# Patient Record
Sex: FEMALE | NOT HISPANIC OR LATINO | Employment: OTHER | URBAN - METROPOLITAN AREA
[De-identification: names, ages, dates, MRNs, and addresses within clinical notes are randomized per-mention and may not be internally consistent; named-entity substitution may affect disease eponyms.]

---

## 2023-05-13 ENCOUNTER — OFFICE VISIT (OUTPATIENT)
Dept: FAMILY MEDICINE | Facility: CLINIC | Age: 63
End: 2023-05-13

## 2023-05-13 VITALS
SYSTOLIC BLOOD PRESSURE: 106 MMHG | DIASTOLIC BLOOD PRESSURE: 73 MMHG | TEMPERATURE: 98 F | OXYGEN SATURATION: 97 % | HEART RATE: 93 BPM

## 2023-05-13 DIAGNOSIS — J06.9 VIRAL URI WITH COUGH: Primary | ICD-10-CM

## 2023-05-13 DIAGNOSIS — J02.9 ACUTE SORE THROAT: ICD-10-CM

## 2023-05-13 DIAGNOSIS — R05.1 ACUTE COUGH: ICD-10-CM

## 2023-05-13 LAB
DEPRECATED S PYO AG THROAT QL EIA: NEGATIVE
GROUP A STREP BY PCR: NOT DETECTED

## 2023-05-13 PROCEDURE — 99203 OFFICE O/P NEW LOW 30 MIN: CPT

## 2023-05-13 PROCEDURE — 87651 STREP A DNA AMP PROBE: CPT

## 2023-05-13 RX ORDER — ASPIRIN 81 MG/1
81 TABLET ORAL DAILY
COMMUNITY

## 2023-05-13 RX ORDER — BENZONATATE 200 MG/1
200 CAPSULE ORAL 3 TIMES DAILY PRN
Qty: 21 CAPSULE | Refills: 0 | Status: SHIPPED | OUTPATIENT
Start: 2023-05-13

## 2023-05-13 RX ORDER — AZELASTINE 1 MG/ML
1 SPRAY, METERED NASAL 2 TIMES DAILY
Qty: 30 ML | Refills: 0 | Status: SHIPPED | OUTPATIENT
Start: 2023-05-13

## 2023-05-13 RX ORDER — CHOLECALCIFEROL (VITAMIN D3) 50 MCG
1 TABLET ORAL DAILY
COMMUNITY

## 2023-05-13 NOTE — PROGRESS NOTES
URGENT CARE    ASSESSMENT AND PLAN:      ICD-10-CM    1. Acute sore throat  J02.9 Streptococcus A Rapid Scr w Reflx to PCR     Group A Streptococcus PCR Throat Swab     CANCELED: Streptococcus A Rapid Scr w Reflx to PCR      2. Acute cough  R05.1           Differential Diagnosis include but not limited to COVID 19, Pneumonia, Peritonsillar abscess, Strep pharyngitis, Tonsilitis, Viral pharyngitis, Viral upper respiratory illness, etc.  RST is negative today.  I see no clinical evidence of  peritonsillar abscess or retropharyngeal abscess.  Suspect symptoms are related to viral URI and will treat with treat with gargles, lozenges, and OTC analgesic as needed.  Prescription for Astelin nasal spray and Tessalon Perles.        Red flag symptoms needing urgent evaluation was discussed and printed off.    Follow up with primary care provider with any problems, questions or concerns or if symptoms worsen or fail to improve. Patient verbalized understanding and is agreeable to plan. The patient was discharged ambulatory and in stable condition.      SUBJECTIVE:  Lisa Reddy is a 62 year old female presenting with a chief complaint of a sore throat.  Onset of symptoms was 3 day(s) ago.  Associated symptoms include dry cough.  Denies fever/chills, muffled voice, sweats, difficulty with hydration, wheezing and shortness of breath  Treatment measures tried include: Decongestants, OTC Cough med, Fluids and Rest.  Predisposing factors include: None.    COVID test home negative    History reviewed. No pertinent past medical history.  Ascorbic Acid (VITAMIN C) 500 MG CHEW,   aspirin 81 MG EC tablet, Take 81 mg by mouth daily  vitamin B complex with vitamin C (VITAMIN  B COMPLEX) tablet, Take 1 tablet by mouth daily  vitamin D3 (CHOLECALCIFEROL) 50 mcg (2000 units) tablet, Take 1 tablet by mouth daily    No current facility-administered medications on file prior to visit.    Social History     Tobacco Use     Smoking status: Never      Passive exposure: Never     Smokeless tobacco: Never   Vaping Use     Vaping status: Not on file   Substance Use Topics     Alcohol use: Not on file     No Known Allergies    Review of Systems  All systems reviewed and negative except per HPI.    OBJECTIVE:   /73   Pulse 93   Temp 98  F (36.7  C) (Tympanic)   SpO2 97%     Physical Exam  GENERAL: healthy, alert and no distress  EYES: Eyes grossly normal to inspection, PERRL and conjunctivae and sclerae normal  HENT: ear canals and TM's normal, nose and mouth without ulcers or lesions. No trismus and uvula midline.  No palatal petechiae.  NECK: no adenopathy, no asymmetry, masses, or scars and thyroid normal to palpation  RESP: lungs clear to auscultation - no rales, rhonchi or wheezes  CV: regular rate and rhythm, normal S1 S2, click or rub, no peripheral edema   PSYCH: mentation appears normal, affect normal/bright      Laboratory and Diagnostics    Results for orders placed or performed in visit on 05/13/23   Streptococcus A Rapid Scr w Reflx to PCR     Status: Normal    Specimen: Throat; Swab   Result Value Ref Range    Group A Strep antigen Negative Negative

## 2023-05-13 NOTE — PATIENT INSTRUCTIONS
Adult Self-Care for Colds  Colds are caused by viruses. They can t be cured with antibiotics. However, you can relieve symptoms and support your body s efforts to heal itself. No matter which symptoms you have, be sure to drink plenty of fluids (water or clear soup); stop smoking and drinking alcohol; and get plenty of rest.      Understand a fever  Relax, lie down. Go to bed if you want. Just get off your feet and rest. Also, drink plenty of fluids to avoid dehydration.  Take acetaminophen or a nonsteroidal anti-inflammatory agent (NSAID), such as ibuprofen.  A fever is a normal reaction of your body to an illness. The temperature itself often isn t harmful. It actually helps your body fight infections. You don t need to treat a fever unless you feel very uncomfortable.   If the fever doesn t get better within 1 hour after you take acetaminophen, take ibuprofen. If this works, keep taking the ibuprofen every 6 to 8 hours.   If either medicine alone doesn t keep the fever down, you may switch off between the 2 medicines every 3 to 4 hours. For example, take ibuprofen. Wait 3 hours. Then take acetaminophen. Wait 3 hours. Take ibuprofen, and so on.  Treat a troubled nose kindly  Breathe steam or heated humidified air to open blocked nasal passages.  a hot shower or use a vaporizer. Be careful not to get burned by the steam.  Saline nasal sprays and decongestant tablets help open a stuffy nose. Antihistamines (Claritin, Zyrtec, etc.) can also help, but they can cause side effects such as drowsiness and drying of the eyes, nose, and mouth.  Nasal rinses such as Netti pot will help with the sinus congestion and nasal drainage.   Soothe a sore throat and cough  Gargle every 2 hours with 1/4 teaspoon of salt dissolved in 1/2 cup of warm water. Suck on throat lozenges and cough drops to moisten your throat.  Gargling with Chloraseptic spray   Cough medicines are available but it is unclear how effective they  actually are.  Asmita Claros tbs for cough suppressant   Take acetaminophen or an NSAID, such as ibuprofen to ease throat pain  Humidifier in room where you are sleeping.   Ease digestive problems  Put fluid back into your body. Take frequent sips of clear liquids such as water or broth. Do not drink beverages with a lot of sugar in them, such as juices and sodas. These can make diarrhea worse. Older children and adults can drink sports drinks.  Patient will need to drink at least 1.5-2 liters of fluids daily for adults and 1-1.5 liters for children. If vomiting and not tolerating liquids for more than 24 hrs, please go to your nearest emergency department for IV fluids and further treatment.   Maintain hydration by drinking small amounts of clear fluids frequently, then soft diet, and then advance diet as tolerated. May use any prescribed imodium if desired for any diarrhea. Call if symptoms worsen, high fever, severe weakness or fainting, increased abdominal pain, blood in stool or vomit, or failure to improve in 2-3 days.   As your appetite returns, you can resume your normal diet. Ask your doctor whether there are any foods you should avoid.  When to seek medical care  When you first notice symptoms, ask your health care provider if antiviral medications are appropriate. Antibiotics should not be taken for colds or flu. Also, call your doctor if you have any of the following symptoms or if you aren t feeling better after 7 days:  Shortness of breath  Pain or pressure in the chest or abdomen  Worsening symptoms, especially after a period of improvement  Fever that doesn t go down with medication  Sudden dizziness or confusion  Severe or continued vomiting  Signs of dehydration, including extreme thirst, dark urine, infrequent urination, dry mouth  Spotted, red, or very sore throat